# Patient Record
(demographics unavailable — no encounter records)

---

## 2025-01-22 NOTE — REVIEW OF SYSTEMS
[Shortness Of Breath] : shortness of breath [Anxiety] : anxiety [Depression] : depression [Negative] : Heme/Lymph

## 2025-01-22 NOTE — HISTORY OF PRESENT ILLNESS
[FreeTextEntry1] : S/P L PMX w/ NL/SLN (4/12/19): +0.6cm SHY, SBR I, -LVI, -0/1 LN, -margins, ER+, HI+, Her2 1+, Ki67: 3% S/P emergent L Br Evac Hematoma (4/13/19) L [id number] Stage IA IDCA Pt saw Carlotta Canales/[facility] > Rec: No chemo > RT > AI Completed RT (6/19, [facility]) Started Arimidex (7/19) > self d/c'ed (9/19) s/t depression/pain > now better Pt w/ L Br Ca detected on Scr Mammo/Sono (3/26/19) S/P L Sono Core Bx (L 10:00)(3/27/19): +WD IDCA, SBR I, ER+, HI+, Her2 - +FH Br Ca (M. Aunt 80's, M. FC 50's) +FH Ov Ca (M. Aunt) BRCA (Ambry)(8/4/20)(N. Boxer): - Pt hosp (5/19) for pneumonia > better No prior Breast Surgery, Breast Procedures or Nipple Discharge.  No FH Ovarian, Pancreatic Cancer or Melanoma.  Pt S/P R TKR (9/20, HSS) > went well S/P L TKR (4/24)(HSS) > doing OK Started on Zetia 3x/wk, lost 17 lbs > cholest down S/P Cataract OU (3/21) S/P OS retina surgery (7/22) for swollen retina TVU (11/21): +endom th > S/P endom Bx (11/5/21): +"susp" cells > S/P D&C (12/21): Benign > F/U Abd Sono (3/22) S/P SC Hyst/BSO (7/22)([facility]) > Benign Pt hosp (5/23) for bleeding Gasrtric Ulcers and poss pancreatitis s/t Mobic ingestion > pantoprazole > better Pt hosp (8/24) for Urosepsis > better Colonoscopy (2018): "WNL" > 5yrs  PAP/Pelvic (2021): "WNL"  Had COVID (4/22) > recovered Got Pfizer booster (2/21/21)(RUE) No other MH/FH changes. Taking VIKASH COLEY reviewed/discussed. Last Bone Densitometry (6/24): WNL w/ sig LS improvement Mammo/Sono (6/10/24): FG, NSF

## 2025-01-22 NOTE — PHYSICAL EXAM
[Normocephalic] : normocephalic [Atraumatic] : atraumatic [Supple] : supple [No Supraclavicular Adenopathy] : no supraclavicular adenopathy [No Cervical Adenopathy] : no cervical adenopathy [No Thyromegaly] : no thyromegaly [Normal Sinus Rhythm] : normal sinus rhythm [Examined in the supine and seated position] : examined in the supine and seated position [No dominant masses] : no dominant masses in right breast  [No dominant masses] : no dominant masses left breast [No Nipple Retraction] : no left nipple retraction [No Nipple Discharge] : no left nipple discharge [No Axillary Lymphadenopathy] : no left axillary lymphadenopathy [No Edema] : no edema [No Rashes] : no rashes [No Ulceration] : no ulceration [de-identified] : +FC tissue NSF  [de-identified] : S/P PMX/SLN/RT. NER. %. No lymphedema.